# Patient Record
Sex: FEMALE
[De-identification: names, ages, dates, MRNs, and addresses within clinical notes are randomized per-mention and may not be internally consistent; named-entity substitution may affect disease eponyms.]

---

## 2024-06-03 ENCOUNTER — DASHBOARD ENCOUNTERS (OUTPATIENT)
Age: 39
End: 2024-06-03

## 2024-06-03 ENCOUNTER — OFFICE VISIT (OUTPATIENT)
Dept: URBAN - METROPOLITAN AREA CLINIC 23 | Facility: CLINIC | Age: 39
End: 2024-06-03
Payer: COMMERCIAL

## 2024-06-03 VITALS
WEIGHT: 136 LBS | DIASTOLIC BLOOD PRESSURE: 65 MMHG | BODY MASS INDEX: 21.35 KG/M2 | HEART RATE: 96 BPM | TEMPERATURE: 99 F | SYSTOLIC BLOOD PRESSURE: 106 MMHG | HEIGHT: 67 IN

## 2024-06-03 DIAGNOSIS — K59.04 CHRONIC IDIOPATHIC CONSTIPATION: ICD-10-CM

## 2024-06-03 PROBLEM — 82934008: Status: ACTIVE | Noted: 2024-06-03

## 2024-06-03 PROBLEM — 440630006: Status: ACTIVE | Noted: 2024-06-03

## 2024-06-03 PROCEDURE — 99203 OFFICE O/P NEW LOW 30 MIN: CPT | Performed by: NURSE PRACTITIONER

## 2024-06-03 RX ORDER — TOPIRAMATE 200 MG/1
1 TABLET TABLET, COATED ORAL ONCE A DAY
Status: ACTIVE | COMMUNITY

## 2024-06-03 RX ORDER — LORAZEPAM 2 MG/1
1 TABLET AT BEDTIME AS NEEDED TABLET ORAL ONCE A DAY
Status: ACTIVE | COMMUNITY

## 2024-06-03 NOTE — EXAM-PHYSICAL EXAM
General--no acute distress, well nourished Eyes--anicteric, clear conjunctiva HENT--normocephalic, atraumatic head, oropharynx clear Chest--clear to auscultation anteriorly, unlabored breathing, equal chest rise and fall Heart--regular rate and rhythm, no lower extremity edema Abdomen--soft, non tender, non distended, bowel sounds present Skin--no rashes, no jaundice, no pallor Neurologic--alert and appropriate

## 2024-06-03 NOTE — HPI-TODAY'S VISIT:
38 year old female here for evaluation of constipation and abdominal pain  Reports 2 years with constipation, 10-12 days without a bowel movements, associated bloating, mid abdominal pain pain 3-4/7 days. Once she goes to the restroom, it is urgent and described as a squeezing sensation. Will have 3 episodes or stool output. Abdominal pain improves after defecation. Abdominal pain is exacerbated with or without oral intake. Tried OTC MiraLAX, Dulcolax, Senokot, magnesium citrate and stool softners.   Seen by previous GI physician. No procedures or prescription medications for constipation.  Denies blood in stool. Denies thin caliber stools. Good appetite. No weight changes.  Also feels nauseated 3-4 days out of the week. No vomiting. Early satiety. Previously taken ondansetron but does not help. Also tried promethazine with a better response.   History of PUD via EGD. No similar symptoms of PUD.  No family history of GI disease or malignancy.  History of hysterectomy.

## 2024-07-10 ENCOUNTER — OFFICE VISIT (OUTPATIENT)
Dept: URBAN - METROPOLITAN AREA CLINIC 23 | Facility: CLINIC | Age: 39
End: 2024-07-10

## 2024-07-10 RX ORDER — LORAZEPAM 2 MG/1
1 TABLET AT BEDTIME AS NEEDED TABLET ORAL ONCE A DAY
Status: ACTIVE | COMMUNITY

## 2024-07-10 RX ORDER — TOPIRAMATE 200 MG/1
1 TABLET TABLET, COATED ORAL ONCE A DAY
Status: ACTIVE | COMMUNITY

## 2024-07-10 NOTE — HPI-TODAY'S VISIT:
7/10/24: F/u on constipation. Given Linzess samples.   6/4/2024: 38 year old female here for evaluation of constipation and abdominal pain  Reports 2 years with constipation, 10-12 days without a bowel movements, associated bloating, mid abdominal pain pain 3-4/7 days. Once she goes to the restroom, it is urgent and described as a squeezing sensation. Will have 3 episodes or stool output. Abdominal pain improves after defecation. Abdominal pain is exacerbated with or without oral intake. Tried OTC MiraLAX, Dulcolax, Senokot, magnesium citrate and stool softners.   Seen by previous GI physician. No procedures or prescription medications for constipation.  Denies blood in stool. Denies thin caliber stools. Good appetite. No weight changes.  Also feels nauseated 3-4 days out of the week. No vomiting. Early satiety. Previously taken ondansetron but does not help. Also tried promethazine with a better response.   History of PUD via EGD. No similar symptoms of PUD.  No family history of GI disease or malignancy.  History of hysterectomy.